# Patient Record
Sex: FEMALE | Race: WHITE
[De-identification: names, ages, dates, MRNs, and addresses within clinical notes are randomized per-mention and may not be internally consistent; named-entity substitution may affect disease eponyms.]

---

## 2020-02-11 NOTE — ED
Chest Pain HPI





- General


Chief Complaint: Chest Pain


Stated Complaint: chest pain


Time Seen by Provider: 02/11/20 07:01


Source: patient


Mode of arrival: ambulatory


Limitations: no limitations





- History of Present Illness


Initial Comments: 





This patient's a 30-year-old female who presents emergency department today with

chest pain and lower back pain after bending over to  her daughter.  

Patient reports that the pain feels worse than previous back pain and muscle 

spasms.  Patient states that she also feels somewhat short of breath.  She 

states that she has had no previous cardiac history or history of pulmonary 

she's.  She reports that she has been coughing over the past week.  Patient 

states that she has had no associated abdominal pain nausea or vomiting.. 





- Related Data


                                  Previous Rx's











 Medication  Instructions  Recorded


 


Cyclobenzaprine [Flexeril] 10 mg PO TID #9 tab 02/11/20


 


Ibuprofen [Motrin] 600 mg PO Q8HR PRN #20 tab 02/11/20











                                    Allergies











Allergy/AdvReac Type Severity Reaction Status Date / Time


 


Penicillins Allergy  Swelling Verified 02/11/20 06:38














Review of Systems


ROS Statement: 


Those systems with pertinent positive or pertinent negative responses have been 

documented in the HPI.





ROS Other: All systems not noted in ROS Statement are negative.





EKG Findings





- EKG Comments:


EKG Findings:: EKG performed at 6:50 AM shows sinus tachycardia otherwise normal

EKG.  Ventricular rate of 116 bpm.  Those 128 ms.  QRS ration 70 ms.  QT QTc is 

3:30/458 ms.





Past Medical History


Past Medical History: No Reported History


History of Any Multi-Drug Resistant Organisms: None Reported


Past Surgical History: Tonsillectomy


Past Psychological History: No Psychological Hx Reported


Smoking Status: Current every day smoker


Past Alcohol Use History: None Reported


Past Drug Use History: None Reported





General Exam





- General Exam Comments


Initial Comments: 





30-year-old female.  Alert and oriented.  Patient is mild discomfort. 


Limitations: no limitations


General appearance: alert, in no apparent distress


Head exam: Present: atraumatic, normocephalic, normal inspection


Eye exam: Present: normal appearance, PERRL, EOMI.  Absent: scleral icterus, 

conjunctival injection, periorbital swelling


ENT exam: Present: normal exam, mucous membranes moist


Neck exam: Present: normal inspection.  Absent: tenderness, meningismus, 

lymphadenopathy


Respiratory exam: Present: normal lung sounds bilaterally.  Absent: respiratory 

distress, wheezes, rales, rhonchi, stridor


Cardiovascular Exam: Present: regular rate


GI/Abdominal exam: Present: soft, normal bowel sounds.  Absent: distended, 

tenderness, guarding, rebound, rigid


Extremities exam: Present: normal inspection, full ROM, normal capillary refill.

 Absent: tenderness, pedal edema, joint swelling, calf tenderness


Back exam: Present: normal inspection, tenderness (Lumbar spinal tenderness and 

left-sided.   left sided Muscle spinal tenderness.)


Neurological exam: Present: alert, oriented X3, CN II-XII intact


Psychiatric exam: Present: normal affect, normal mood


Skin exam: Present: warm, dry, intact, normal color.  Absent: rash





Course


                                   Vital Signs











  02/11/20 02/11/20 02/11/20





  06:36 06:58 07:38


 


Temperature 98.1 F  


 


Pulse Rate 115 H  99


 


Respiratory 20 18 20





Rate   


 


Blood Pressure 125/69  120/74


 


O2 Sat by Pulse 97  99





Oximetry   














Chest Pain [facility]





- MDM





This Patient is a 30-year-old female presented today for either of her concern 

for chest pain and back pain with picking up the posterior her daughter.  

Patient is here with her sister.  At the same pain seems to be positional worse 

with movement.  When I went to evaluate the Patient and IV is established.  We 

did check blood work and this is unremarkable.  EKG showed some sinus 

tachycardia, her heart rate came to normal limits when she was resting in the 

room.  Patient chest x-rays negative.  Lumbar spine x-ray shows some mild 

degenerative changes and mild spinal curvature.  Lumbar MRI may be of benefit.  

Discussed this with Patient.  I discussed the Patient labs were otherwise 

unremarkable.  On reevaluation she is resting comfortably bed and sleeping.  She

does feel better after Toradol and Norflex.  I discussed discharged Patient with

a short course of Tequin for medicine and advise close follow-up with PCP.  All 

questions were answered return parameters were discussed.





Disposition


Clinical Impression: 


 Back spasm, Atypical chest pain





Disposition: HOME SELF-CARE


Condition: Stable


Instructions (If sedation given, give patient instructions):  Muscle Spasm (ED),

Chest Wall Pain (ED)


Additional Instructions: 


Patient advised against inflammatory medication.  Follow-up with PCP.  Return to

the ED if any alarming signs or symptoms occur.


Prescriptions: 


Cyclobenzaprine [Flexeril] 10 mg PO TID #9 tab


Ibuprofen [Motrin] 600 mg PO Q8HR PRN #20 tab


 PRN Reason: Pain


Is patient prescribed a controlled substance at d/c from ED?: No


Referrals: 


Nonstaff,Physician [Primary Care Provider] - 1-2 days


Meaghan Roach MD [REFERRING] - 1-2 days


Renato Ekcert MD [STAFF PHYSICIAN] - 1-2 days


Time of Disposition: 09:20

## 2020-02-11 NOTE — XR
EXAMINATION TYPE: XR chest 2V

 

DATE OF EXAM: 2/11/2020

 

COMPARISON: NONE

 

HISTORY: Chest pain

 

TECHNIQUE:  Frontal and lateral views of the chest are obtained.

 

FINDINGS:  There is no focal air space opacity, pleural effusion, or pneumothorax seen.  The cardiac 
silhouette size is within normal limits.   The osseous structures are intact. There are overlying car
diac leads.

 

IMPRESSION:  No acute cardiopulmonary process.

## 2020-02-11 NOTE — XR
Lumbar spine

 

HISTORY: Back pain

 

3 views of lumbar spine

 

There is a slight spinal curvature. Lumbar vertebral bodies show preserved height and bone mineraliza
tion. Mild spondylosis present. There is loss of disc height present at L2-3.

 

IMPRESSION: Mild spinal curvature, mild degenerative disc changes. Lumbar MRI may be of benefit.

## 2020-02-12 NOTE — ED
Back Pain HPI





- General


Chief Complaint: Back Pain/Injury


Stated Complaint: back & chest pain


Time Seen by Provider: 02/12/20 09:20


Source: patient


Limitations: no limitations





- History of Present Illness


Initial Comments: 


30-year-old female with history of IV drug use with no use in the last 3 years 

with cocaine use 2 days ago presented for chest pain low back pain.  Patient st

ates that she developed both low back and chest pain when she bent down and 

picked up her daughter.  She states that the pain is sharp in nature.  She 

states that the low back pain increases to palpation on the left side any 

twisting movement.  Patient states increases with lifting the left leg.  Patient

denies any sharp shooting pain down the leg denies a loss of bowel bladder 

control urinary retention fevers or history of cancer.  Patient denies any 

numbness of the lower extremities or weakness of the lower extremity.  Patient 

denies any midline tenderness she states is more off to the side.  Patient 

denies hematuria dysuria or urgency frequency or vaginal discharge.  Patient 

states she has also had on the left side of her chest just under the group a 

sharp pain that comes and goes and is present currently.  Denies pleuritic chest

pain denies shortness of breath denies cough status history of DVT or lower 

extremity swelling.  Patient has no other complaints and upon arrival she appea

rs well the signs of acute distress.








- Related Data


                                  Previous Rx's











 Medication  Instructions  Recorded


 


Cyclobenzaprine [Flexeril] 10 mg PO TID #9 tab 02/11/20


 


Ibuprofen [Motrin] 600 mg PO Q8HR PRN #20 tab 02/11/20











                                    Allergies











Allergy/AdvReac Type Severity Reaction Status Date / Time


 


Penicillins Allergy  Swelling Verified 02/12/20 08:58














Review of Systems


ROS Statement: 


Those systems with pertinent positive or pertinent negative responses have been 

documented in the HPI.





ROS Other: All systems not noted in ROS Statement are negative.





Past Medical History


Past Medical History: No Reported History


History of Any Multi-Drug Resistant Organisms: None Reported


Past Surgical History: Tonsillectomy


Past Psychological History: No Psychological Hx Reported


Smoking Status: Current every day smoker


Past Alcohol Use History: None Reported


Past Drug Use History: None Reported





General Exam





- General Exam Comments


Initial Comments: 


General:  The patient is awake and alert, in no distress, and does not appear 

acutely ill. 


Eye:  +3 mm pupils are equal, round and reactive to light, extra-ocular 

movements are intact.  No nystagmus.  There is normal conjunctiva bilaterally.  

No signs of icterus.  


Ears, nose, mouth and throat:  There are moist mucous membranes and no oral 

lesions. 


Neck:  The neck is supple, there is no tenderness or JVD.  


Cardiovascular:  There is a regular rate and rhythm. Holosystolic mumur grade 1 

no rub or gallop is appreciated.


Respiratory:  Lungs are clear to auscultation, respirations are non-labored, 

breath sounds are equal.  No wheezes, stridor, rales, or rhonchi.


Gastrointestinal:  Soft, non-distended, non-tender abdomen without masses or 

organomegaly noted. There is no rebound or guarding present.  


Musculoskeletal: No midline tenderness to patient the cervical thoracic and 

lumbar spine.  Patient has mild paraspinal left-sided tenderness to palpation.  

Positive straight leg raise left side.  No CVA tenderness.  Strength and range 

of motion of the lower extremity bilaterally.  Sensation including the saddle 

region.  Patient is +2 dorsalis pedis pulses as well as radial pulses.  

Bilaterally equal


Neurological:  A&O x 3. CN II-XII intact grossly, There are no obvious motor or 

sensory deficits. Coordination appears grossly intact. Speech is normal.


Skin:  Skin is warm and dry and no rashes or lesions are noted. No LE edema.


Psychiatric:  Cooperative, appropriate mood & affect, normal judgment.  





Limitations: no limitations





Course


                                   Vital Signs











  02/12/20





  08:55


 


Temperature 98.5 F


 


Pulse Rate 100


 


Respiratory 18





Rate 


 


Blood Pressure 134/75


 


O2 Sat by Pulse 96





Oximetry 














Medical Decision Making





- Medical Decision Making


30-year-old male presenting today for chief complaint of low back pain, chest 

pain, indigestion yesterday revealed UDS consistent with cocaine abuse otherwise

 nose and can't acute findings troponin negative.  EKG no acute findings 

troponin negative d-dimer WNL. Patient VS stable. Afebrile denies IVDU with (-) 

UDS yesterday. Patient CXR clear. Pain controlled with one dose of morphine.  

Patient had echocardiogram change giving the murmur that was auscultated on 

physical examination.  Patient however did not want to wait for echocardiogram 

reading by cardiology and left AGAINST MEDICAL ADVICE prior to completion of 

care.  She is aware that this could result in disability or death worsening 

infection or current state. Patient left the emergency department.








- Lab Data


Result diagrams: 


                                 02/12/20 09:36





                                 02/12/20 09:36


                                   Lab Results











  02/12/20 02/12/20 02/12/20 Range/Units





  09:36 09:36 09:36 


 


WBC  6.7    (3.8-10.6)  k/uL


 


RBC  4.43    (3.80-5.40)  m/uL


 


Hgb  13.7    (11.4-16.0)  gm/dL


 


Hct  41.9    (34.0-46.0)  %


 


MCV  94.6    (80.0-100.0)  fL


 


MCH  30.9    (25.0-35.0)  pg


 


MCHC  32.6    (31.0-37.0)  g/dL


 


RDW  12.7    (11.5-15.5)  %


 


Plt Count  243    (150-450)  k/uL


 


Neutrophils %  55    %


 


Lymphocytes %  34    %


 


Monocytes %  5    %


 


Eosinophils %  3    %


 


Basophils %  1    %


 


Neutrophils #  3.7    (1.3-7.7)  k/uL


 


Lymphocytes #  2.3    (1.0-4.8)  k/uL


 


Monocytes #  0.3    (0-1.0)  k/uL


 


Eosinophils #  0.2    (0-0.7)  k/uL


 


Basophils #  0.1    (0-0.2)  k/uL


 


PT    9.6  (9.0-12.0)  sec


 


INR    0.9  (<1.2)  


 


APTT    22.5  (22.0-30.0)  sec


 


D-Dimer    0.19  (<0.60)  mg/L FEU


 


Sodium   136 L   (137-145)  mmol/L


 


Potassium   4.5   (3.5-5.1)  mmol/L


 


Chloride   106   ()  mmol/L


 


Carbon Dioxide   26   (22-30)  mmol/L


 


Anion Gap   4   mmol/L


 


BUN   15   (7-17)  mg/dL


 


Creatinine   0.58   (0.52-1.04)  mg/dL


 


Est GFR (CKD-EPI)AfAm   >90   (>60 ml/min/1.73 sqM)  


 


Est GFR (CKD-EPI)NonAf   >90   (>60 ml/min/1.73 sqM)  


 


Glucose   99   (74-99)  mg/dL


 


Calcium   8.9   (8.4-10.2)  mg/dL


 


Magnesium   1.9   (1.6-2.3)  mg/dL


 


Total Bilirubin   0.5   (0.2-1.3)  mg/dL


 


AST   36   (14-36)  U/L


 


ALT   37 H   (4-34)  U/L


 


Alkaline Phosphatase   78   ()  U/L


 


Troponin I     (0.000-0.034)  ng/mL


 


Total Protein   6.4   (6.3-8.2)  g/dL


 


Albumin   3.7   (3.5-5.0)  g/dL


 


Urine Color     


 


Urine Appearance     (Clear)  


 


Urine pH     (5.0-8.0)  


 


Ur Specific Gravity     (1.001-1.035)  


 


Urine Protein     (Negative)  


 


Urine Glucose (UA)     (Negative)  


 


Urine Ketones     (Negative)  


 


Urine Blood     (Negative)  


 


Urine Nitrite     (Negative)  


 


Urine Bilirubin     (Negative)  


 


Urine Urobilinogen     (<2.0)  mg/dL


 


Ur Leukocyte Esterase     (Negative)  


 


Urine HCG, Qual     (Not Detectd)  














  02/12/20 02/12/20 02/12/20 Range/Units





  09:36 11:45 11:45 


 


WBC     (3.8-10.6)  k/uL


 


RBC     (3.80-5.40)  m/uL


 


Hgb     (11.4-16.0)  gm/dL


 


Hct     (34.0-46.0)  %


 


MCV     (80.0-100.0)  fL


 


MCH     (25.0-35.0)  pg


 


MCHC     (31.0-37.0)  g/dL


 


RDW     (11.5-15.5)  %


 


Plt Count     (150-450)  k/uL


 


Neutrophils %     %


 


Lymphocytes %     %


 


Monocytes %     %


 


Eosinophils %     %


 


Basophils %     %


 


Neutrophils #     (1.3-7.7)  k/uL


 


Lymphocytes #     (1.0-4.8)  k/uL


 


Monocytes #     (0-1.0)  k/uL


 


Eosinophils #     (0-0.7)  k/uL


 


Basophils #     (0-0.2)  k/uL


 


PT     (9.0-12.0)  sec


 


INR     (<1.2)  


 


APTT     (22.0-30.0)  sec


 


D-Dimer     (<0.60)  mg/L FEU


 


Sodium     (137-145)  mmol/L


 


Potassium     (3.5-5.1)  mmol/L


 


Chloride     ()  mmol/L


 


Carbon Dioxide     (22-30)  mmol/L


 


Anion Gap     mmol/L


 


BUN     (7-17)  mg/dL


 


Creatinine     (0.52-1.04)  mg/dL


 


Est GFR (CKD-EPI)AfAm     (>60 ml/min/1.73 sqM)  


 


Est GFR (CKD-EPI)NonAf     (>60 ml/min/1.73 sqM)  


 


Glucose     (74-99)  mg/dL


 


Calcium     (8.4-10.2)  mg/dL


 


Magnesium     (1.6-2.3)  mg/dL


 


Total Bilirubin     (0.2-1.3)  mg/dL


 


AST     (14-36)  U/L


 


ALT     (4-34)  U/L


 


Alkaline Phosphatase     ()  U/L


 


Troponin I  <0.012    (0.000-0.034)  ng/mL


 


Total Protein     (6.3-8.2)  g/dL


 


Albumin     (3.5-5.0)  g/dL


 


Urine Color    Light Yellow  


 


Urine Appearance    Clear  (Clear)  


 


Urine pH    7.5  (5.0-8.0)  


 


Ur Specific Gravity    1.016  (1.001-1.035)  


 


Urine Protein    Negative  (Negative)  


 


Urine Glucose (UA)    Negative  (Negative)  


 


Urine Ketones    Negative  (Negative)  


 


Urine Blood    Negative  (Negative)  


 


Urine Nitrite    Negative  (Negative)  


 


Urine Bilirubin    Negative  (Negative)  


 


Urine Urobilinogen    <2.0  (<2.0)  mg/dL


 


Ur Leukocyte Esterase    Negative  (Negative)  


 


Urine HCG, Qual   Not Detected   (Not Detectd)  














Disposition


Clinical Impression: 


 Chest pain, Low back pain, Back injury





Disposition: Left Against Medical Advice


Condition: Undetermined


Referrals: 


Nonstaff,Physician [Primary Care Provider] - 1-2 days


Time of Disposition: 12:56

## 2020-02-12 NOTE — ECHOF
Referral Reason:murmur, chest pain, hx of IVDU (denies current)



MEASUREMENTS

--------

HEIGHT: 165.1 cm

WEIGHT: 79.4 kg

BP: 

RVIDd:   2.4 cm     (< 3.3)

IVSd:   1.1 cm     (0.6 - 1.1)

LVIDd:   3.9 cm     (3.9 - 5.3)

LVPWd:   1.1 cm     (0.6 - 1.1)

IVSs:   1.4 cm

LVIDs:   2.6 cm

LVPWs:   1.6 cm

LAESV Index (A-L):   21.81 ml/m

Ao Diam:   2.6 cm     (2.0 - 3.7)

AV Cusp:   2.1 cm     (1.5 - 2.6)

LA Diam:   2.6 cm     (2.7 - 3.8)

MV EXCURSION:   13.362 mm     (> 18.000)

MV EF SLOPE:   180 mm/s     (70 - 150)

EPSS:   0.5 cm

MV E Pierre:   0.86 m/s

MV DecT:   189 ms

MV A Pierre:   0.50 m/s

MV E/A Ratio:   1.74 

RAP:   5.00 mmHg

RVSP:   18.11 mmHg

TAPSE:   21.48 mm







FINDINGS

--------

Sinus rhythm.

This was a technically good study.

The left ventricular size is normal.   Left ventricular wall thickness is normal.   Overall left vent
ricular systolic function is normal with, an EF between 55 - 60 %.   The diastolic filling pattern is
 normal for the age of the patient 7.20.

The right ventricle is normal in size.

The left atrial size is normal.

The right atrial size is normal.

The aortic valve is trileaflet and appears structurally normal.

The mitral valve is normal.   The mitral valve leaflets are mildly thickened.   Mild mitral regurgita
tion is present.

The tricuspid valve appears structurally normal.   Trace tricuspid regurgitation present.   Right maribell
tricular systolic pressure is normal at < 35 mmHg.

There is no pulmonic regurgitation present.

The aortic root size is normal.

Normal inferior vena cava with normal inspiratory collapse consistent with estimated right atrial pre
ssure of  5 mmHg.

There is no pericardial effusion.



CONCLUSIONS

--------

1. Sinus rhythm.

2. This was a technically good study.

3. The left ventricular size is normal.

4. Left ventricular wall thickness is normal.

5. Overall left ventricular systolic function is normal with, an EF between 55 - 60 %.

6. The diastolic filling pattern is normal for the age of the patient 7.20

7. The right ventricle is normal in size.

8. The left atrial size is normal.

9. The right atrial size is normal.

10. The aortic valve is trileaflet and appears structurally normal.

11. The mitral valve is normal.

12. The mitral valve leaflets are mildly thickened.

13. Mild mitral regurgitation is present.

14. The tricuspid valve appears structurally normal.

15. Trace tricuspid regurgitation present.

16. Right ventricular systolic pressure is normal at < 35 mmHg.

17. There is no pulmonic regurgitation present.

18. The aortic root size is normal.

19. Normal inferior vena cava with normal inspiratory collapse consistent with estimated right atrial
 pressure of  5 mmHg.

20. There is no pericardial effusion.





SONOGRAPHER: Pricilla Palacio RDCS

## 2020-02-18 NOTE — ED
Overdose HPI





- General


Stated Complaint: overdose


Time Seen by Provider: 02/18/20 01:13





- History of Present Illness


Initial Comments: 


Bladimir is a 30-year-old female with a history of substance abuse, patient 

reports she's been clean for 2-1/2 years.  Today she received some terrible news

that her mother has stage IV stomach cancer.  Patient became upset and reports 

that she started what she believed was heroin.  Patient subsequently overdose, 

boyfriend found her and started CPR because she was not breathing.  911 was 

called.  EMS arrived on scene, patient was unresponsive and apneic but had 

strong pulses, CPR was discontinued, provided patient with IV Narcan incessantly

woke up.  Initially she tried to refuse coming to the hospital but agreed.  Upon

arrival she reports she is feeling fine with no complaints.








- Related Data


                                  Previous Rx's











 Medication  Instructions  Recorded


 


Cyclobenzaprine [Flexeril] 10 mg PO TID #9 tab 02/11/20


 


Ibuprofen [Motrin] 600 mg PO Q8HR PRN #20 tab 02/11/20











                                    Allergies











Allergy/AdvReac Type Severity Reaction Status Date / Time


 


Penicillins Allergy  Swelling Verified 02/18/20 01:20














Review of Systems


ROS Statement: 


Those systems with pertinent positive or pertinent negative responses have been 

documented in the HPI.





ROS Other: All systems not noted in ROS Statement are negative.





Past Medical History


Past Medical History: No Reported History


History of Any Multi-Drug Resistant Organisms: None Reported


Past Surgical History: Tonsillectomy


Past Psychological History: No Psychological Hx Reported


Smoking Status: Current every day smoker


Past Alcohol Use History: None Reported


Past Drug Use History: None Reported





General Exam





- General Exam Comments


Initial Comments: 


Physical Exam


GENERAL:


Patient is well-developed and well-nourished.  Patient is nontoxic and well-

hydrated and is in no distress.





HENT:


Normocephalic, Atraumatic. 





EYES:


PERRL, EOMI





PULMONARY:


Unlabored respirations.  No audible rales rhonchi or wheezing was noted.





CARDIOVASCULAR:


Tachycardiac, regular





ABDOMEN:


Soft and nontender with normal bowel sounds. 





SKIN:


Skin is clear with no lesions or rashes and otherwise unremarkable.





: 


Deferred





NEUROLOGIC:


Patient is alert and oriented x3.  Moving all extremities spontaneously





MUSCULOSKELETAL:


Normal extremities with adequate strength and full range of motion.  No lower 

extremity swelling or edema.  No calf tenderness.  





PSYCHIATRIC:


Normal psychiatric evaluation.








Course


                                   Vital Signs











  02/18/20 02/18/20





  01:16 03:05


 


Temperature 98.4 F 98 F


 


Pulse Rate 98 89


 


Respiratory 20 20





Rate  


 


Blood Pressure 137/83 138/78


 


O2 Sat by Pulse 97 97





Oximetry  














Medical Decision Making





- Medical Decision Making


The patient was seen and evaluated history is obtained from the patient


Patient has been clean for 2-1/2 years started what she believed to be heroin 

today and subsequent was noted to overdose, bystanders began CPR when she was 

unresponsive to EMS arrived on scene and patient had strong pulses, she was 

apneic and unresponsive until she received Narcan.  Patient since then has been 

awake alert and oriented with no complaints.





Patient arrived to the emergency department in no acute distress, no chest pain 

no bruising noted


Chest x-ray was unremarkable


Urine pregnancy negative, urine drug screen is positive only for cocaine





These results were discussed with patient.  I advised I suspect that the drug 

that she snorted may have been fentanyl and cocaine rather than heroin. 


Patient has remained awake alert oriented throughout her stay in the emergency 

department with no signs of  respiratory depression.  At this time patient 

stable for discharge home.








- Lab Data


                                   Lab Results











  02/18/20 02/18/20 Range/Units





  01:32 01:32 


 


Urine HCG, Qual   Not Detected  (Not Detectd)  


 


Urine Opiates Screen  Not Detected   (NotDetected)  


 


Ur Oxycodone Screen  Not Detected   (NotDetected)  


 


Urine Methadone Screen  Not Detected   (NotDetected)  


 


Ur Propoxyphene Screen  Not Detected   (NotDetected)  


 


Ur Barbiturates Screen  Not Detected   (NotDetected)  


 


U Tricyclic Antidepress  Not Detected   (NotDetected)  


 


Ur Phencyclidine Scrn  Not Detected   (NotDetected)  


 


Ur Amphetamines Screen  Not Detected   (NotDetected)  


 


U Methamphetamines Scrn  Not Detected   (NotDetected)  


 


U Benzodiazepines Scrn  Not Detected   (NotDetected)  


 


Urine Cocaine Screen  Detected H   (NotDetected)  


 


U Marijuana (THC) Screen  Not Detected   (NotDetected)  














Disposition


Clinical Impression: 


 Accidental drug overdose





Disposition: HOME SELF-CARE


Condition: Stable


Instructions (If sedation given, give patient instructions):  Adult Overdose 

(ED)


Is patient prescribed a controlled substance at d/c from ED?: No


Referrals: 


Nonstaff,Physician [Primary Care Provider] - 1-2 days

## 2020-02-18 NOTE — XR
EXAMINATION TYPE: XR chest 2V

 

DATE OF EXAM: 2/18/2020

 

COMPARISON: 2/11/2020

 

HISTORY: Chest pain

 

TECHNIQUE:

 

FINDINGS: Heart and mediastinum are normal. Lungs are clear. Diaphragm is normal. Bony thorax appears
 normal.

 

IMPRESSION: Normal chest. No change.

## 2020-05-18 NOTE — ED
General Adult HPI





- General


Chief complaint: Vaginal Bleeding


Stated complaint: spotting ,10 weeks pregnant


Time Seen by Provider: 20 13:44


Source: patient, RN notes reviewed


Mode of arrival: ambulatory


Limitations: no limitations





- History of Present Illness


Initial comments: 





30-year-old  female currently about 2 months pregnant presents to the 

emergency department for vaginal spotting.  Patient states she started spotting 

last night.  Patient states that she had 5 positive pregnancy tests within the 

past week.  Patient states she has miscarried twice in the past.  Patient is 

unsure of her last menstrual period but states it was about 1 month ago.  

Patient also has some suprapubic pressure but denies any other symptoms.Patient 

has no other complaints at this time including shortness of breath, chest pain, 

abd pain, nausea or vomiting, headache, or visual changes.





- Related Data


                                  Previous Rx's











 Medication  Instructions  Recorded


 


Cyclobenzaprine [Flexeril] 10 mg PO TID #9 tab 20


 


Ibuprofen [Motrin] 600 mg PO Q8HR PRN #20 tab 20











                                    Allergies











Allergy/AdvReac Type Severity Reaction Status Date / Time


 


Penicillins Allergy  Swelling Verified 20 13:41














Review of Systems


ROS Statement: 


Those systems with pertinent positive or pertinent negative responses have been 

documented in the HPI.





ROS Other: All systems not noted in ROS Statement are negative.





Past Medical History


Past Medical History: Asthma


History of Any Multi-Drug Resistant Organisms: None Reported


Past Surgical History: Tonsillectomy


Past Psychological History: No Psychological Hx Reported


Smoking Status: Current every day smoker


Past Alcohol Use History: None Reported


Past Drug Use History: None Reported





General Exam


Limitations: no limitations


General appearance: alert, in no apparent distress


Head exam: Present: atraumatic, normocephalic, normal inspection


Eye exam: Present: normal appearance, PERRL, EOMI.  Absent: scleral icterus, 

conjunctival injection, periorbital swelling


ENT exam: Present: normal exam, mucous membranes moist


Neck exam: Present: normal inspection, full ROM.  Absent: tenderness, 

meningismus, lymphadenopathy


Respiratory exam: Present: normal lung sounds bilaterally.  Absent: respiratory 

distress, wheezes, rales, rhonchi, stridor


Cardiovascular Exam: Present: regular rate, normal rhythm, normal heart sounds. 

 Absent: systolic murmur, diastolic murmur, rubs, gallop, clicks


GI/Abdominal exam: Present: soft, normal bowel sounds.  Absent: distended, 

tenderness, guarding, rebound, rigid


External exam: Present: normal external exam.  Absent: erythema, swelling, 

lesions, lacerations, ecchymosis


Speculum exam: Present: normal speculum exam.  Absent: erythema, vaginal 

discharge, cervical discharge, vaginal bleeding, foreign body, tissue, 

laceration


By manual exam: Present: uterine tenderness.  Absent: cervical motion 

tenderness, adnexal tenderness, adnexal mass, uterine enlargement, other


Neurological exam: Present: alert





Course


                                   Vital Signs











  20





  13:39


 


Temperature 98.4 F


 


Pulse Rate 109 H


 


Respiratory 18





Rate 


 


Blood Pressure 131/82


 


O2 Sat by Pulse 99





Oximetry 














Medical Decision Making





- Medical Decision Making





Vitals are stable.  Physical exam is unremarkable.  CBC CMP unremarkable.  

Urinalysis does not show any evidence of infection.  There is no bacteria in the

 urine.  Troponin is negative.  Gonorrhea chlamydia pending.  She is O+.  

Ultrasound shows fetal pole not identified with certainty.  Findings could 

possibly represent incomplete miscarriage, fetal demise, or early pregnancy.  

HCG is 1838.  I discussed the patient the importance of repeating this in 2 days

 to see if it is spreading upwards.  She sees an OB/GYN in [place].  Results will

 be sent to them.  Patient is aware she needs repeat ultrasound.  She will 

return here for worsening symptoms.





- Lab Data


Result diagrams: 


                                 20 14:20





                                 20 14:20


                                   Lab Results











  20 Range/Units





  14:00 14:00 14:20 


 


WBC    8.0  (3.8-10.6)  k/uL


 


RBC    4.33  (3.80-5.40)  m/uL


 


Hgb    13.4  (11.4-16.0)  gm/dL


 


Hct    40.4  (34.0-46.0)  %


 


MCV    93.5  (80.0-100.0)  fL


 


MCH    31.0  (25.0-35.0)  pg


 


MCHC    33.2  (31.0-37.0)  g/dL


 


RDW    12.3  (11.5-15.5)  %


 


Plt Count    241  (150-450)  k/uL


 


Neutrophils %    72  %


 


Lymphocytes %    22  %


 


Monocytes %    4  %


 


Eosinophils %    1  %


 


Basophils %    1  %


 


Neutrophils #    5.7  (1.3-7.7)  k/uL


 


Lymphocytes #    1.8  (1.0-4.8)  k/uL


 


Monocytes #    0.3  (0-1.0)  k/uL


 


Eosinophils #    0.1  (0-0.7)  k/uL


 


Basophils #    0.1  (0-0.2)  k/uL


 


Sodium     (137-145)  mmol/L


 


Potassium     (3.5-5.1)  mmol/L


 


Chloride     ()  mmol/L


 


Carbon Dioxide     (22-30)  mmol/L


 


Anion Gap     mmol/L


 


BUN     (7-17)  mg/dL


 


Creatinine     (0.52-1.04)  mg/dL


 


Est GFR (CKD-EPI)AfAm     (>60 ml/min/1.73 sqM)  


 


Est GFR (CKD-EPI)NonAf     (>60 ml/min/1.73 sqM)  


 


Glucose     (74-99)  mg/dL


 


Calcium     (8.4-10.2)  mg/dL


 


Total Bilirubin     (0.2-1.3)  mg/dL


 


AST     (14-36)  U/L


 


ALT     (4-34)  U/L


 


Alkaline Phosphatase     ()  U/L


 


Total Protein     (6.3-8.2)  g/dL


 


Albumin     (3.5-5.0)  g/dL


 


HCG, Quant     mIU/mL


 


Urine Color  Yellow    


 


Urine Appearance  Cloudy H    (Clear)  


 


Urine pH  6.5    (5.0-8.0)  


 


Ur Specific Gravity  1.025    (1.001-1.035)  


 


Urine Protein  Trace H    (Negative)  


 


Urine Glucose (UA)  Negative    (Negative)  


 


Urine Ketones  Negative    (Negative)  


 


Urine Blood  Negative    (Negative)  


 


Urine Nitrite  Negative    (Negative)  


 


Urine Bilirubin  Negative    (Negative)  


 


Urine Urobilinogen  2.0    (<2.0)  mg/dL


 


Ur Leukocyte Esterase  Moderate H    (Negative)  


 


Urine RBC  1    (0-5)  /hpf


 


Urine WBC  3    (0-5)  /hpf


 


Ur Squamous Epith Cells  34 H    (0-4)  /hpf


 


Amorphous Sediment  Rare H    (None)  /hpf


 


Hyaline Casts  1    (0-2)  /lpf


 


Urine Mucus  Few H    (None)  /hpf


 


Urine HCG, Qual   Detected   (Not Detectd)  


 


Trichomonas Ag (Rapid)     (Negative)  


 


Blood Type     


 


Blood Type Recheck     


 


Bld Type Recheck Status     














  05/18/20 05/18/20 05/18/20 Range/Units





  14:20 14:20 14:20 


 


WBC     (3.8-10.6)  k/uL


 


RBC     (3.80-5.40)  m/uL


 


Hgb     (11.4-16.0)  gm/dL


 


Hct     (34.0-46.0)  %


 


MCV     (80.0-100.0)  fL


 


MCH     (25.0-35.0)  pg


 


MCHC     (31.0-37.0)  g/dL


 


RDW     (11.5-15.5)  %


 


Plt Count     (150-450)  k/uL


 


Neutrophils %     %


 


Lymphocytes %     %


 


Monocytes %     %


 


Eosinophils %     %


 


Basophils %     %


 


Neutrophils #     (1.3-7.7)  k/uL


 


Lymphocytes #     (1.0-4.8)  k/uL


 


Monocytes #     (0-1.0)  k/uL


 


Eosinophils #     (0-0.7)  k/uL


 


Basophils #     (0-0.2)  k/uL


 


Sodium  137    (137-145)  mmol/L


 


Potassium  3.7    (3.5-5.1)  mmol/L


 


Chloride  107    ()  mmol/L


 


Carbon Dioxide  23    (22-30)  mmol/L


 


Anion Gap  7    mmol/L


 


BUN  8    (7-17)  mg/dL


 


Creatinine  0.61    (0.52-1.04)  mg/dL


 


Est GFR (CKD-EPI)AfAm  >90    (>60 ml/min/1.73 sqM)  


 


Est GFR (CKD-EPI)NonAf  >90    (>60 ml/min/1.73 sqM)  


 


Glucose  138 H    (74-99)  mg/dL


 


Calcium  9.1    (8.4-10.2)  mg/dL


 


Total Bilirubin  0.3    (0.2-1.3)  mg/dL


 


AST  44 H    (14-36)  U/L


 


ALT  61 H    (4-34)  U/L


 


Alkaline Phosphatase  87    ()  U/L


 


Total Protein  6.5    (6.3-8.2)  g/dL


 


Albumin  3.8    (3.5-5.0)  g/dL


 


HCG, Quant    1838.9  mIU/mL


 


Urine Color     


 


Urine Appearance     (Clear)  


 


Urine pH     (5.0-8.0)  


 


Ur Specific Gravity     (1.001-1.035)  


 


Urine Protein     (Negative)  


 


Urine Glucose (UA)     (Negative)  


 


Urine Ketones     (Negative)  


 


Urine Blood     (Negative)  


 


Urine Nitrite     (Negative)  


 


Urine Bilirubin     (Negative)  


 


Urine Urobilinogen     (<2.0)  mg/dL


 


Ur Leukocyte Esterase     (Negative)  


 


Urine RBC     (0-5)  /hpf


 


Urine WBC     (0-5)  /hpf


 


Ur Squamous Epith Cells     (0-4)  /hpf


 


Amorphous Sediment     (None)  /hpf


 


Hyaline Casts     (0-2)  /lpf


 


Urine Mucus     (None)  /hpf


 


Urine HCG, Qual     (Not Detectd)  


 


Trichomonas Ag (Rapid)     (Negative)  


 


Blood Type   O Positive   


 


Blood Type Recheck   No Previous Record   


 


Bld Type Recheck Status   [facility] ONLY   














  20 Range/Units





  15:54 


 


WBC   (3.8-10.6)  k/uL


 


RBC   (3.80-5.40)  m/uL


 


Hgb   (11.4-16.0)  gm/dL


 


Hct   (34.0-46.0)  %


 


MCV   (80.0-100.0)  fL


 


MCH   (25.0-35.0)  pg


 


MCHC   (31.0-37.0)  g/dL


 


RDW   (11.5-15.5)  %


 


Plt Count   (150-450)  k/uL


 


Neutrophils %   %


 


Lymphocytes %   %


 


Monocytes %   %


 


Eosinophils %   %


 


Basophils %   %


 


Neutrophils #   (1.3-7.7)  k/uL


 


Lymphocytes #   (1.0-4.8)  k/uL


 


Monocytes #   (0-1.0)  k/uL


 


Eosinophils #   (0-0.7)  k/uL


 


Basophils #   (0-0.2)  k/uL


 


Sodium   (137-145)  mmol/L


 


Potassium   (3.5-5.1)  mmol/L


 


Chloride   ()  mmol/L


 


Carbon Dioxide   (22-30)  mmol/L


 


Anion Gap   mmol/L


 


BUN   (7-17)  mg/dL


 


Creatinine   (0.52-1.04)  mg/dL


 


Est GFR (CKD-EPI)AfAm   (>60 ml/min/1.73 sqM)  


 


Est GFR (CKD-EPI)NonAf   (>60 ml/min/1.73 sqM)  


 


Glucose   (74-99)  mg/dL


 


Calcium   (8.4-10.2)  mg/dL


 


Total Bilirubin   (0.2-1.3)  mg/dL


 


AST   (14-36)  U/L


 


ALT   (4-34)  U/L


 


Alkaline Phosphatase   ()  U/L


 


Total Protein   (6.3-8.2)  g/dL


 


Albumin   (3.5-5.0)  g/dL


 


HCG, Quant   mIU/mL


 


Urine Color   


 


Urine Appearance   (Clear)  


 


Urine pH   (5.0-8.0)  


 


Ur Specific Gravity   (1.001-1.035)  


 


Urine Protein   (Negative)  


 


Urine Glucose (UA)   (Negative)  


 


Urine Ketones   (Negative)  


 


Urine Blood   (Negative)  


 


Urine Nitrite   (Negative)  


 


Urine Bilirubin   (Negative)  


 


Urine Urobilinogen   (<2.0)  mg/dL


 


Ur Leukocyte Esterase   (Negative)  


 


Urine RBC   (0-5)  /hpf


 


Urine WBC   (0-5)  /hpf


 


Ur Squamous Epith Cells   (0-4)  /hpf


 


Amorphous Sediment   (None)  /hpf


 


Hyaline Casts   (0-2)  /lpf


 


Urine Mucus   (None)  /hpf


 


Urine HCG, Qual   (Not Detectd)  


 


Trichomonas Ag (Rapid)  Negative  (Negative)  


 


Blood Type   


 


Blood Type Recheck   


 


Bld Type Recheck Status   














Disposition


Clinical Impression: 


 Vaginal bleeding during pregnancy





Disposition: HOME SELF-CARE


Condition: Fair


Instructions (If sedation given, give patient instructions):  Threatened 

Miscarriage (ED)


Additional Instructions: 


Please repeat hCG in 2 days.  Follow-up with OB/GYN by calling either today or 

tomorrow morning.  If you have worsening since it is worsening pain return to 

the emergency department.  As discussed U will need another ultrasound as well.


Is patient prescribed a controlled substance at d/c from ED?: No


Referrals: 


Meaghan Roach MD [REFERRING] - 1-2 days


Time of Disposition: 16:26

## 2020-05-18 NOTE — US
EXAMINATION TYPE: Transabdominal

 

DATE OF EXAM: 5/18/2020 3:17 PM

 

COMPARISON: NONE

 

CLINICAL HISTORY: pain. Spotting and pain

 

EXAM PERFORMED:  Transabdominal (TA)

 

EXAM MEASUREMENTS:

 

GESTATIONAL AGE / DATING

 

Physician Established: Not yet established 

Dates by LMP:  LMP unknown 

Dates by First Scan:  No previous this is first scan  

Dates by Current Scan for: ( weeks/5 days)  

** EDC: 01/06/2021

 

MATERNAL ANATOMY 

 

Uterus: 9.1 x 4.8 x 6.6 cm 

Right Ovary: 3.9 x 2.5 x 2.3 cm 

Left Ovary: 3.8 x 1.8 x 1.7 cm 

Post CDS / Adnexa: wnl

Presence of free fluid: no

Presence of corpus luteal cyst: no

Presence of subchorionic bleed: no

 

GESTATION / FETAL SURVEY

 

CRL: 0.8 cm (6 weeks/5 days) not well seen

Yolk Sac (normal less than 6mm): 5 mm

IUP:

 

The gestational sac is irregular in appearance, a fetal pole is not seen with certainty

 

IMPRESSION:

Fetal pole not identified with certainty on images provided. Consider follow-up, endovaginal scanning
 for better evaluation. Findings could possibly represent incomplete miscarriage or fetal demise or e
ailyn pregnancy, technologist reports no fetal activity with fetal pole noted, these findings not well
 seen on transabdominal scanning.

## 2020-06-01 NOTE — ED
General Adult HPI





- General


Chief complaint: Urogenital


Stated complaint: 9 wks pregnant/lower abd pain


Time Seen by Provider: 20 15:04


Source: patient, RN notes reviewed


Mode of arrival: ambulatory


Limitations: no limitations





- History of Present Illness


Initial comments: 





30 -year-old  female currently about 9 weeks pregnant presents to the 

emergency department for a chief complaint of dysuria.  Patient states she has 

had dysuria for the past several days.  States she has been trying to drink 

plenty of water to flush out the bacteria but it is not working.  Patient was 

tested for gonorrhea and chlamydia 2 weeks ago as well as tremors which were 

negative but does report that she has had intercourse since that time.  Patient 

has not followed up with her OB nor did she have repeat blood testing performed 

as directed.  Patient still reports some suprapubic pain denies vaginal 

bleeding.Patient has no other complaints at this time including shortness of 

breath, chest pain, nausea or vomiting, headache, or visual changes.





- Related Data


                                  Previous Rx's











 Medication  Instructions  Recorded


 


Cyclobenzaprine [Flexeril] 10 mg PO TID #9 tab 20


 


Ibuprofen [Motrin] 600 mg PO Q8HR PRN #20 tab 20


 


Cephalexin [Keflex] 500 mg PO Q6HR 7 Days #28 cap 20











                                    Allergies











Allergy/AdvReac Type Severity Reaction Status Date / Time


 


Penicillins Allergy  Swelling Verified 20 14:02














Review of Systems


ROS Statement: 


Those systems with pertinent positive or pertinent negative responses have been 

documented in the HPI.





ROS Other: All systems not noted in ROS Statement are negative.





Past Medical History


Past Medical History: Asthma


History of Any Multi-Drug Resistant Organisms: None Reported


Past Surgical History: Tonsillectomy


Past Psychological History: Anxiety


Smoking Status: Current every day smoker


Past Alcohol Use History: None Reported


Past Drug Use History: Marijuana





General Exam


Limitations: no limitations


General appearance: alert, in no apparent distress


Head exam: Present: atraumatic, normocephalic, normal inspection


Eye exam: Present: normal appearance, PERRL, EOMI.  Absent: scleral icterus, 

conjunctival injection, periorbital swelling


ENT exam: Present: normal exam, mucous membranes moist


Neck exam: Present: normal inspection, full ROM.  Absent: tenderness, 

meningismus, lymphadenopathy


Respiratory exam: Present: normal lung sounds bilaterally.  Absent: respiratory 

distress, wheezes, rales, rhonchi, stridor


Cardiovascular Exam: Present: regular rate, normal rhythm, normal heart sounds. 

 Absent: systolic murmur, diastolic murmur, rubs, gallop, clicks


GI/Abdominal exam: Present: soft, normal bowel sounds.  Absent: distended, 

tenderness, guarding, rebound, rigid


External exam: Present: normal external exam.  Absent: erythema, swelling, 

lesions, lacerations, ecchymosis


Speculum exam: Present: normal speculum exam.  Absent: erythema, vaginal 

discharge, cervical discharge, vaginal bleeding, foreign body, tissue, 

laceration


By manual exam: Present: normal by manual exam.  Absent: cervical motion 

tenderness, adnexal tenderness, adnexal mass, uterine enlargement, uterine 

tenderness


Neurological exam: Present: alert


Psychiatric exam: Present: normal affect, normal mood





Course


                                   Vital Signs











  20





  14:00


 


Temperature 98.4 F


 


Pulse Rate 96


 


Respiratory 18





Rate 


 


Blood Pressure 155/79


 


O2 Sat by Pulse 100





Oximetry 














Medical Decision Making





- Medical Decision Making





Vitals are stable.  CBC CMP is unremarkable.  Urinalysis does show moderate 

leukocyte esterase with 5 white blood cells and occasional mucus and bacteria.  

Given symptoms of dysuria patient will be treated with antibiotic.  Ultrasound 

shows successful interval progression with a now confirmed single live 

intrauterine gestation course pending to 6 weeks 4 days.  Heart rate 132.  

Patient will follow up with OB/GYN as soon as possible.  She states she will 

call her OB/GYN tomorrow, they are out of [place].  She has any 

worsening symptoms she will return here to the emergency room.





- Lab Data


Result diagrams: 


                                 20 15:46





                                 20 15:46


                                   Lab Results











  20 Range/Units





  14:30 15:46 15:46 


 


WBC   9.7   (3.8-10.6)  k/uL


 


RBC   4.12   (3.80-5.40)  m/uL


 


Hgb   13.4   (11.4-16.0)  gm/dL


 


Hct   38.7   (34.0-46.0)  %


 


MCV   94.0   (80.0-100.0)  fL


 


MCH   32.4   (25.0-35.0)  pg


 


MCHC   34.5   (31.0-37.0)  g/dL


 


RDW   12.7   (11.5-15.5)  %


 


Plt Count   252   (150-450)  k/uL


 


Neutrophils %   76   %


 


Lymphocytes %   19   %


 


Monocytes %   3   %


 


Eosinophils %   1   %


 


Basophils %   0   %


 


Neutrophils #   7.3   (1.3-7.7)  k/uL


 


Lymphocytes #   1.9   (1.0-4.8)  k/uL


 


Monocytes #   0.3   (0-1.0)  k/uL


 


Eosinophils #   0.1   (0-0.7)  k/uL


 


Basophils #   0.0   (0-0.2)  k/uL


 


Sodium    134 L  (137-145)  mmol/L


 


Potassium    4.1  (3.5-5.1)  mmol/L


 


Chloride    104  ()  mmol/L


 


Carbon Dioxide    22  (22-30)  mmol/L


 


Anion Gap    8  mmol/L


 


BUN    7  (7-17)  mg/dL


 


Creatinine    0.48 L  (0.52-1.04)  mg/dL


 


Est GFR (CKD-EPI)AfAm    >90  (>60 ml/min/1.73 sqM)  


 


Est GFR (CKD-EPI)NonAf    >90  (>60 ml/min/1.73 sqM)  


 


Glucose    87  (74-99)  mg/dL


 


Calcium    9.0  (8.4-10.2)  mg/dL


 


Total Bilirubin    0.5  (0.2-1.3)  mg/dL


 


AST    30  (14-36)  U/L


 


ALT    47 H  (4-34)  U/L


 


Alkaline Phosphatase    75  ()  U/L


 


Total Protein    6.4  (6.3-8.2)  g/dL


 


Albumin    3.8  (3.5-5.0)  g/dL


 


Urine Color  Yellow    


 


Urine Appearance  Cloudy H    (Clear)  


 


Urine pH  6.5    (5.0-8.0)  


 


Ur Specific Gravity  1.017    (1.001-1.035)  


 


Urine Protein  Negative    (Negative)  


 


Urine Glucose (UA)  Negative    (Negative)  


 


Urine Ketones  Negative    (Negative)  


 


Urine Blood  Negative    (Negative)  


 


Urine Nitrite  Negative    (Negative)  


 


Urine Bilirubin  Negative    (Negative)  


 


Urine Urobilinogen  <2.0    (<2.0)  mg/dL


 


Ur Leukocyte Esterase  Moderate H    (Negative)  


 


Urine RBC  2    (0-5)  /hpf


 


Urine WBC  5    (0-5)  /hpf


 


Ur Squamous Epith Cells  20 H    (0-4)  /hpf


 


Urine Bacteria  Rare H    (None)  /hpf


 


Urine Mucus  Occasional H    (None)  /hpf


 


Trichomonas Ag (Rapid)     (Negative)  














  20 Range/Units





  15:46 


 


WBC   (3.8-10.6)  k/uL


 


RBC   (3.80-5.40)  m/uL


 


Hgb   (11.4-16.0)  gm/dL


 


Hct   (34.0-46.0)  %


 


MCV   (80.0-100.0)  fL


 


MCH   (25.0-35.0)  pg


 


MCHC   (31.0-37.0)  g/dL


 


RDW   (11.5-15.5)  %


 


Plt Count   (150-450)  k/uL


 


Neutrophils %   %


 


Lymphocytes %   %


 


Monocytes %   %


 


Eosinophils %   %


 


Basophils %   %


 


Neutrophils #   (1.3-7.7)  k/uL


 


Lymphocytes #   (1.0-4.8)  k/uL


 


Monocytes #   (0-1.0)  k/uL


 


Eosinophils #   (0-0.7)  k/uL


 


Basophils #   (0-0.2)  k/uL


 


Sodium   (137-145)  mmol/L


 


Potassium   (3.5-5.1)  mmol/L


 


Chloride   ()  mmol/L


 


Carbon Dioxide   (22-30)  mmol/L


 


Anion Gap   mmol/L


 


BUN   (7-17)  mg/dL


 


Creatinine   (0.52-1.04)  mg/dL


 


Est GFR (CKD-EPI)AfAm   (>60 ml/min/1.73 sqM)  


 


Est GFR (CKD-EPI)NonAf   (>60 ml/min/1.73 sqM)  


 


Glucose   (74-99)  mg/dL


 


Calcium   (8.4-10.2)  mg/dL


 


Total Bilirubin   (0.2-1.3)  mg/dL


 


AST   (14-36)  U/L


 


ALT   (4-34)  U/L


 


Alkaline Phosphatase   ()  U/L


 


Total Protein   (6.3-8.2)  g/dL


 


Albumin   (3.5-5.0)  g/dL


 


Urine Color   


 


Urine Appearance   (Clear)  


 


Urine pH   (5.0-8.0)  


 


Ur Specific Gravity   (1.001-1.035)  


 


Urine Protein   (Negative)  


 


Urine Glucose (UA)   (Negative)  


 


Urine Ketones   (Negative)  


 


Urine Blood   (Negative)  


 


Urine Nitrite   (Negative)  


 


Urine Bilirubin   (Negative)  


 


Urine Urobilinogen   (<2.0)  mg/dL


 


Ur Leukocyte Esterase   (Negative)  


 


Urine RBC   (0-5)  /hpf


 


Urine WBC   (0-5)  /hpf


 


Ur Squamous Epith Cells   (0-4)  /hpf


 


Urine Bacteria   (None)  /hpf


 


Urine Mucus   (None)  /hpf


 


Trichomonas Ag (Rapid)  Negative  (Negative)  














Disposition


Clinical Impression: 


 Intrauterine pregnancy, Dysuria





Disposition: HOME SELF-CARE


Condition: Good


Instructions (If sedation given, give patient instructions):  Urinary Tract 

Infection in Women (ED)


Additional Instructions: 


Please take antibiotic as directed.  Please follow up as soon as possible with 

her OB/GYN.  Call tomorrow.  Continue to take prenatal vitamins.  Return to the 

emergency room for any worsening symptoms or vaginal bleeding.


Prescriptions: 


Cephalexin [Keflex] 500 mg PO Q6HR 7 Days #28 cap


Is patient prescribed a controlled substance at d/c from ED?: No


Referrals: 


Meaghan Roach MD [REFERRING] - 1-2 days


Time of Disposition: 17:01

## 2020-07-01 NOTE — ED
ENT HPI





- General


Chief complaint: ENT


Stated complaint: Chills, sore throat, 11 wks pregnant


Time Seen by Provider: 07/01/20 21:38


Source: patient


Mode of arrival: ambulatory


Limitations: no limitations





- History of Present Illness


Initial comments: 





Patient is a 30-year-old female presenting to the emergency Department with 

complaints of a sore throat and a cough that started yesterday.  Patient states 

she has been feeling hot and having chills but no recorded fevers.  She states 

she also has some mild nasal congestion and is also admitting to mild dysuria.  

She is currently 11 weeks pregnant, no complications thus far.  She denies any 

abdominal pain, vaginal bleeding.  She states she is nauseous but she has been 

throughout her entire pregnancy.  She denies any chest pain, shortness of 

breath.  She does admit to history of mild asthma.  She states she is very 

anxious about the possibility of having Covid.  She denies any recent sick 

contacts.  She has no further complaints at this time.  Upon arrival to the ER, 

her vitals are stable.





- Related Data


                                Home Medications











 Medication  Instructions  Recorded  Confirmed


 


Pnv No.95/Ferrous Fum/Folic AC 1 tab PO DAILY 07/01/20 07/01/20





[Prenatal Multivitamin Tablet]   











                                    Allergies











Allergy/AdvReac Type Severity Reaction Status Date / Time


 


Penicillins Allergy  Swelling Verified 07/01/20 22:15














Review of Systems


ROS Statement: 


Those systems with pertinent positive or pertinent negative responses have been 

documented in the HPI.





ROS Other: All systems not noted in ROS Statement are negative.





Past Medical History


Past Medical History: Asthma


History of Any Multi-Drug Resistant Organisms: None Reported


Past Surgical History: Tonsillectomy


Past Psychological History: Anxiety


Smoking Status: Current every day smoker


Past Alcohol Use History: None Reported


Past Drug Use History: None Reported, Marijuana





General Exam





- General Exam Comments


Initial Comments: 





GENERAL: 


Well-appearing, well-nourished and in no acute distress.





HEAD: 


Atraumatic, normocephalic.





EYES:


Pupils equal round and reactive to light, extraocular movements intact, sclera 

anicteric, conjunctiva are normal.





ENT: 


TMs normal, nares patent, oropharynx clear without exudates.  Moist mucous 

membranes.





NECK: 


Normal range of motion, supple without lymphadenopathy or JVD.





LUNGS:


 Breath sounds clear to auscultation bilaterally and equal.  No wheezes rales or

rhonchi.





HEART:


Regular rate and rhythm without murmurs, rubs or gallops.





ABDOMEN: 


Soft, nontender, normoactive bowel sounds.  No guarding, no rebound.  No masses 

appreciated.





: Deferred 





EXTREMITIES: 


Normal range of motion, no pitting or edema.  No clubbing or cyanosis.





NEUROLOGICAL: 


Normal speech, normal gait.





PSYCH:


Normal mood, normal affect.





SKIN:


 Warm, Dry, normal turgor, no rashes or lesions noted.


Limitations: no limitations





Course


                                   Vital Signs











  07/01/20 07/01/20 07/01/20





  21:34 21:52 22:46


 


Temperature 98.9 F  99.5 F


 


Pulse Rate 122 H  96


 


Respiratory 18 16 16





Rate   


 


Blood Pressure 139/69  115/71


 


O2 Sat by Pulse 97  98





Oximetry   














Medical Decision Making





- Medical Decision Making





Patient is a 22-year-old female here for a sore throat, cough for one day.  She 

is very anxious about possibly having Covid.  No recorded fevers.  She is 

afebrile here.  Her exam is unremarkable.  She is currently 11 weeks pregnant, 

no complications.  No abdominal pain or vaginal bleeding.  Was negative, chest 

x-ray showed no acute findings.  Her urine showed no signs of infection.  We did

do a Covid swab and that is pending at this time.  I discussed with patient 

these findings.  She most likely has a mild viral illness.  I did recommend salt

water gargles for her sore throat as well as Tylenol.  She is stable for 

discharge and is in agreement with this plan of care.  She'll follow-up with her

OB/GYN.  Return parameters were discussed with the patient she verbalized 

understanding.





- Lab Data


                                   Lab Results











  07/01/20 07/01/20 Range/Units





  21:57 21:58 


 


Urine Color  Yellow   


 


Urine Appearance  Cloudy H   (Clear)  


 


Urine pH  8.5 H   (5.0-8.0)  


 


Ur Specific Gravity  1.021   (1.001-1.035)  


 


Urine Protein  1+ H   (Negative)  


 


Urine Glucose (UA)  Negative   (Negative)  


 


Urine Ketones  Negative   (Negative)  


 


Urine Blood  Negative   (Negative)  


 


Urine Nitrite  Negative   (Negative)  


 


Urine Bilirubin  Negative   (Negative)  


 


Urine Urobilinogen  4.0   (<2.0)  mg/dL


 


Ur Leukocyte Esterase  Small H   (Negative)  


 


Urine RBC  1   (0-5)  /hpf


 


Urine WBC  5   (0-5)  /hpf


 


Ur Squamous Epith Cells  62 H   (0-4)  /hpf


 


Urine Mucus  Rare H   (None)  /hpf


 


Group A Strep Rapid   Negative  (Negative)  














Disposition


Clinical Impression: 


 Sore throat, Cough, Viral illness





Disposition: HOME SELF-CARE


Condition: Stable


Instructions (If sedation given, give patient instructions):  Cold Symptoms (ED)


Additional Instructions: 


Please return to the Emergency Department if symptoms worsen or any other 

concerns.


Recommend cough drops, Tylenol for throat discomfort.  


Your COVID test is pending at this time. 


Follow up with OB/GYN.


Is patient prescribed a controlled substance at d/c from ED?: No


Referrals: 


None,Stated [Primary Care Provider] - 1-2 days

## 2020-07-01 NOTE — XR
EXAMINATION TYPE: XR chest 2V

 

DATE OF EXAM: 7/1/2020

 

COMPARISON: 2/18/2020

 

HISTORY: Cough and fever

 

TECHNIQUE:

 

FINDINGS: Heart and mediastinum are normal. Lungs are clear. Diaphragm is normal. Bony thorax appears
 normal. Pulmonary vascularity is normal.

 

IMPRESSION: Normal chest. No change.

## 2020-10-15 NOTE — P.MSEPDOC
Presenting Problems





- Arrival Data


Date of Arrival on Unit: 20


Time of Arrival on Unit: 13:42


Mode of Transport: Ambulatory





- Complaint


OB-Reason for Admission/Chief Complaint: Pain


Comment: right lower back flank pain rating at 8/10





Prenatal Medical History





- Pregnancy Information


: 3


Para: 2


Term: 0


: 0


Abortions: Spontaneous or Elective: 0


Number of Living Children: 2





- Gestational Age


Gestational Age by JOANN (wks/days): 24 Weeks and 0 Days





- Prenatal History


Pregnancy Complications: Smoker





Review of Systems





- Review of Systems


Constitutional: No problems


Breast: No problems


ENT: No problems


Cardiovascular: No problems


Respiratory: No problems


Gastrointestinal: No problems


Genitourinary: No problems


Musculoskeletal: No problems


Neurological: No problems


Skin: No problems





Vital Signs





- Temperature


Temperature: 97.3 F


Temperature Source: Temporal Artery Scan





- Pulse


  ** Right Brachial


Pulse Rate: 90


Pulse Assessment Method: Automatic Cuff





- Respirations


Respiratory Rate: 18


Oxygen Delivery Method: Room Air


O2 Sat by Pulse Oximetry: 98





- Blood Pressure


  ** Right Arm


Blood Pressure: 107/62


Blood Pressure Mean: 77


Blood Pressure Source: Automatic Cuff





Medical Screen Scoring (Pre)





- Cervical Exam


Dilation: Exam Deferred


Effacement: Exam Deferred


Membranes: Intact





- Uterine Contractions


Frequency: N/A


Duration: N/A


Intensity: N/A





- Maternal Vital Signs


Maternal Temperature: N/A


Maternal Blood Pressure: N/A


Signs of Preeclampsia: N/A


Maternal Respirations: N/A





- Maternal Trauma


Maternal Trauma: N/A





- Fetal Assessment - Baby A


Baseline FHR: 150


Fetal Position: N/A


Fetal Station: N/A





- Total Score - Baby A


Total Score - Baby A: 0





- Total Score - Baby B


Total Score - Baby B: 0





- Total Score - Baby C


Total Score - Baby C: 0





- Level of Risk - Baby A


Level of Risk - Baby A: Low (0-5)





- Level of Risk - Baby B


Level of Risk - Baby B: Low (0-5)





- Level of Risk - Baby C


Level of Risk - Baby C: Low (0-5)





Physician Notification (Pre)





- Physician Notified


Physician Notified Date: 20


Physician Notified Time: 14:05


New Order Received: Yes





- Notification Comment


Comment: lab work, ua, ivf,zofran and offirmeve ordered, pt left AMA at 1505 due

to her son being dropped off at home alone and he is only 7 years old, she had 

no one else to watch him





Disposition





- Disposition


OB Disposition: Triage


I agree with the RN Medical Screening Exam: Yes


Risk & Benefit of care provided described in d/c instruction: Yes


Diagnosis: LOW BACK PAIN

## 2021-01-04 ENCOUNTER — HOSPITAL ENCOUNTER (INPATIENT)
Dept: HOSPITAL 47 - FBPOP | Age: 31
Discharge: LEFT BEFORE DISCHARGE | End: 2021-01-04
Attending: OBSTETRICS & GYNECOLOGY | Admitting: OBSTETRICS & GYNECOLOGY
Payer: COMMERCIAL

## 2021-01-04 VITALS — RESPIRATION RATE: 20 BRPM | TEMPERATURE: 95.9 F

## 2021-01-04 VITALS — SYSTOLIC BLOOD PRESSURE: 130 MMHG | HEART RATE: 76 BPM | DIASTOLIC BLOOD PRESSURE: 65 MMHG

## 2021-01-04 DIAGNOSIS — Z88.0: ICD-10-CM

## 2021-01-04 DIAGNOSIS — Z3A.39: ICD-10-CM

## 2021-01-04 DIAGNOSIS — F11.90: ICD-10-CM

## 2021-01-04 DIAGNOSIS — J45.909: ICD-10-CM

## 2021-01-04 DIAGNOSIS — Z86.59: ICD-10-CM

## 2021-01-04 DIAGNOSIS — F17.210: ICD-10-CM

## 2021-01-04 DIAGNOSIS — Z79.899: ICD-10-CM

## 2021-01-04 DIAGNOSIS — O41.03X0: Primary | ICD-10-CM

## 2021-01-04 DIAGNOSIS — Z23: ICD-10-CM

## 2021-01-04 LAB
BASOPHILS # BLD AUTO: 0 K/UL (ref 0–0.2)
BASOPHILS NFR BLD AUTO: 0 %
EOSINOPHIL # BLD AUTO: 0 K/UL (ref 0–0.7)
EOSINOPHIL NFR BLD AUTO: 1 %
ERYTHROCYTE [DISTWIDTH] IN BLOOD BY AUTOMATED COUNT: 3.56 M/UL (ref 3.8–5.4)
ERYTHROCYTE [DISTWIDTH] IN BLOOD: 13.2 % (ref 11.5–15.5)
HCT VFR BLD AUTO: 31.5 % (ref 34–46)
HGB BLD-MCNC: 11.1 GM/DL (ref 11.4–16)
KETONES UR QL STRIP.AUTO: (no result)
LYMPHOCYTES # SPEC AUTO: 1.2 K/UL (ref 1–4.8)
LYMPHOCYTES NFR SPEC AUTO: 17 %
MCH RBC QN AUTO: 31.2 PG (ref 25–35)
MCHC RBC AUTO-ENTMCNC: 35.3 G/DL (ref 31–37)
MCV RBC AUTO: 88.5 FL (ref 80–100)
MONOCYTES # BLD AUTO: 0.2 K/UL (ref 0–1)
MONOCYTES NFR BLD AUTO: 3 %
NEUTROPHILS # BLD AUTO: 5.7 K/UL (ref 1.3–7.7)
NEUTROPHILS NFR BLD AUTO: 79 %
PH UR: 8 [PH] (ref 5–8)
PLATELET # BLD AUTO: 274 K/UL (ref 150–450)
PROT UR QL: (no result)
RBC UR QL: 10 /HPF (ref 0–5)
SP GR UR: 1.02 (ref 1–1.03)
SQUAMOUS UR QL AUTO: 3 /HPF (ref 0–4)
UROBILINOGEN UR QL STRIP: 4 MG/DL (ref ?–2)
WBC # BLD AUTO: 7.3 K/UL (ref 3.8–10.6)
WBC #/AREA URNS HPF: 3 /HPF (ref 0–5)

## 2021-01-04 PROCEDURE — 00HU33Z INSERTION OF INFUSION DEVICE INTO SPINAL CANAL, PERCUTANEOUS APPROACH: ICD-10-PCS

## 2021-01-04 PROCEDURE — 86901 BLOOD TYPING SEROLOGIC RH(D): CPT

## 2021-01-04 PROCEDURE — 85025 COMPLETE CBC W/AUTO DIFF WBC: CPT

## 2021-01-04 PROCEDURE — 59025 FETAL NON-STRESS TEST: CPT

## 2021-01-04 PROCEDURE — 82947 ASSAY GLUCOSE BLOOD QUANT: CPT

## 2021-01-04 PROCEDURE — 99215 OFFICE O/P EST HI 40 MIN: CPT

## 2021-01-04 PROCEDURE — 88307 TISSUE EXAM BY PATHOLOGIST: CPT

## 2021-01-04 PROCEDURE — 86780 TREPONEMA PALLIDUM: CPT

## 2021-01-04 PROCEDURE — 87491 CHLMYD TRACH DNA AMP PROBE: CPT

## 2021-01-04 PROCEDURE — 87591 N.GONORRHOEAE DNA AMP PROB: CPT

## 2021-01-04 PROCEDURE — 3E0134Z INTRODUCTION OF SERUM, TOXOID AND VACCINE INTO SUBCUTANEOUS TISSUE, PERCUTANEOUS APPROACH: ICD-10-PCS

## 2021-01-04 PROCEDURE — 3E0R3BZ INTRODUCTION OF ANESTHETIC AGENT INTO SPINAL CANAL, PERCUTANEOUS APPROACH: ICD-10-PCS

## 2021-01-04 PROCEDURE — 76815 OB US LIMITED FETUS(S): CPT

## 2021-01-04 PROCEDURE — 81001 URINALYSIS AUTO W/SCOPE: CPT

## 2021-01-04 PROCEDURE — 86900 BLOOD TYPING SEROLOGIC ABO: CPT

## 2021-01-04 PROCEDURE — 86762 RUBELLA ANTIBODY: CPT

## 2021-01-04 PROCEDURE — 87390 HIV-1 AG IA: CPT

## 2021-01-04 PROCEDURE — 86850 RBC ANTIBODY SCREEN: CPT

## 2021-01-04 PROCEDURE — 80306 DRUG TEST PRSMV INSTRMNT: CPT

## 2021-01-04 PROCEDURE — 87340 HEPATITIS B SURFACE AG IA: CPT

## 2021-01-04 RX ADMIN — POTASSIUM CHLORIDE SCH MLS/HR: 14.9 INJECTION, SOLUTION INTRAVENOUS at 12:09

## 2021-01-04 RX ADMIN — POTASSIUM CHLORIDE SCH MLS/HR: 14.9 INJECTION, SOLUTION INTRAVENOUS at 09:00

## 2021-01-04 RX ADMIN — POTASSIUM CHLORIDE SCH MLS/HR: 14.9 INJECTION, SOLUTION INTRAVENOUS at 09:48

## 2021-01-04 NOTE — P.PROBDLV
Vaginal Delivery Note





- .


Vaginal Delivery Note: 





Patient progressed to complete and pushed with spontaneous vaginal delivery of a

viable female  over an intact perineum.  Shortly before delivery 

artificial rupture membranes was performed and thick meconium is noted.  We'll 

try to have anesthesia present for delivery as well as special care nursery.  

Once she began pushing and approximately 4 pushes she dilated completely and was

on the perineum.  Once baby's head was delivered anterior posterior shoulders w

ere easily delivered with gentle downward upper traction followed by the 

remainder the baby from left occiput anterior position.  Attempts to keep baby 

from crying initially were made with bulb suction mouth and nares.  Spontaneous 

cry is noted shortly thereafter.  Baby was placed on mother's abdomen where the 

umbilical cord was allowed to pulsate for 30 seconds prior to clamping and 

cutting.  Again nursery personnel was present and assumed care.  Placenta was 

then delivered intact following obtaining blood for possible unknown Rh antibody

status.  Pitocin is added to the IV following delivery of placenta.  Apgars were

9 and 9 at one and 5 minutes respectively and weight is pending.  Both mother 

and baby however is stable at this time

## 2021-01-04 NOTE — US
EXAMINATION TYPE: US OB limited

 

DATE OF EXAM: 1/4/2021

 

COMPARISON: None

 

CLINICAL HISTORY: 30-year-old female vaginal bleeding and contractions

 

EXAM PERFORMED:  Transabdominal (TA)

 

GESTATIONAL AGE / DATING

Physician Established: (39 weeks/5 days)

** EDC: 1/6/2021

** No growth performed on today?s study per ordering physician 

 

FETAL SURVEY

 

PLACENTA:  Anterior

PREVIA:  No Previa

** Ultrasound evidence of abruption? No

 

** ALBA: 5.8 cm Borderline oligohydramnios

Sonography notes:** Ultrasound evidence of premature rupture of membranes?  Unable to visualized

 

PRESENTATION: Vertex

 

HEART RATE:  143 bpm

RHYTHM:  Normal

 

Sonographer notes: Possible enlarged stomach with internal echoes. 

 

 

 

IMPRESSION:  

1. Established gestational age of 39 weeks 5 days. No growth assessed on today's exam.

2. Borderline oligohydramnios (ALBA 5.8 cm).

3. Possible distended stomach with internal echoes/debris.

## 2024-01-19 NOTE — P.HPOB
January 19, 2024       Holly Pickens MD  [address]  [address]  [facility] [address]  Via Fax: [phone]      Patient: Fabiana Owusu   YOB: 2012   Date of Visit: 1/19/2024       Dear Dr. Pickens:    Thank you for referring Fabiana Owusu to me for evaluation. Below are my notes for this visit with her.    If you have questions, please do not hesitate to call me. I look forward to following your patient along with you.      Sincerely,        Flavia Rizo MD        CC: No Recipients    Flavia Rizo MD  1/19/2024  4:02 PM  Signed     PEDIATRIC NEUROLOGY AMBULATORY CONSULTATION NOTE        Patient Name: Fabiana Owusu   MRN / CSN: [id number]   Date of Birth / Age / Sex: 2012 ,11 year old,female     Encounter Date: 1/19/2024     CARE TEAM:     Encounter Provider: Flavia Rizo MD   Referring Physician: Holly Pickens MD  [id number]  [facility] [address]  [place] [address]   PCP: Holly Pickens MD   HISTORY     No chief complaint on file.         Age: 11 year old      Fabiana Owusu  is accompanied by mom.     Here for evaluation of abnormal MRI brain   She has been evaluated by ophthalmology  And will be following up in august   Has been evaluated by Neurosurgery   Cyclic vomiting syndrome diagnosed in 2016 this is what promted obtaining MRI brain by GI   She had 4 episodes last month   stomach pain sometimes headache and nausea   Episodes last ~ 2 hrs   zofran helps when given early   Prior medication - cyproheptadine  - weight gain    Family history of migraines in mom   Never tried vitamins     Headaches started showing up in January   Has previously woken up with headaches on occasion   No abnormal eye movements   Sleep: trouble falling asleep 1hr    Interval history:  1/19/2024  New diagnosis of ADHD   She is getting more frequent headaches   Rizatriptan is helping she needs an  updated letter for school   Mom reached out 12/8 about increased headaches   We increased topiramate to 50mg  History of Present Illness


H&P Date: 21


Chief Complaint: Intrauterine pregnancy at unknown dates but suspected term: 

Active labor: V





Patient is a 30-year-old  at unknown dates although she relates that she is

due in 2 days.  She states that she had her first ultrasound about 3 weeks ago 

and that she was supposed to have a plan with her obstetrician later today.  She

ryes complaining of vaginal bleeding and severe pain in her abdomen.  Fetal 

heart tones are reassuring and an different points are reactive.  It is a 

category 1 tracing.  In discussing her pregnancy with her, she relates her no 

issues with the pregnancy other than her drug use.  She relates that through 

most of pregnancy she did not use medications or drugs but recently over the 

last 3 weeks she began using heroin and then later she related that she was 

using Suboxone from a friend of hers as well.  Likely she will go into 

withdrawal symptoms while she is in the hospital and we may need to consult 

medicine for assistance in managing.  She has essentially no prenatal care.  

Furthermore the physician and the hospital that she stated that she was going to

had not heard of her and relate that she had no appointments with the doctor in 

the hospital had no prenatal records either.  We'll therefore assume she had no 

prenatal care.  Labs are per Jeny still pending but all prenatal labs were 

ordered.





On physical exam vital signs are stable and afebrile.  Heart regular, lungs 

clear, extremities without pain.  Abdomen is soft between contractions but she 

is still complaining of pain and there is still some bright red bleeding coming 

from the vagina.  There is no evidence for acute abruption on ultrasound but we 

are treating this as a potential for an abruption situation and her monitoring 

her very closely.  We did discuss possibility of an emergent  should it

be warranted but this time with reassuring fetal heart tones below her to 

progress in labor as she is making cervical change.





Assessment intrauterine pregnancy suspect term based on her description


Plan expect spontaneous vaginal delivery and she plans to do an epidural for 

analgesia.  Since the ultrasound showed possible oligo hydramnios we'll delay ar

tificial rupture membranes until just before delivery.  Continuous fetal 

monitoring.  GBS prophylaxis with unknown GBS status.





Past Medical History


Past Medical History: Asthma


History of Any Multi-Drug Resistant Organisms: None Reported


Past Surgical History: Tonsillectomy


Past Anesthesia/Blood Transfusion Reactions: No Reported Reaction


Past Psychological History: Anxiety


Smoking Status: Current every day smoker


Past Alcohol Use History: None Reported


Past Drug Use History: Cocaine, Heroin, Marijuana





- Past Family History


  ** Mother


History Unknown: Yes


Family Medical History: No Reported History





Medications and Allergies


                                Home Medications











 Medication  Instructions  Recorded  Confirmed  Type


 


Pnv No.95/Ferrous Fum/Folic AC 1 tab PO DAILY 20 History





[Prenatal Multivitamin Tablet]    








                                    Allergies











Allergy/AdvReac Type Severity Reaction Status Date / Time


 


Penicillins Allergy  Swelling Verified 21 08:33














Exam


Osteopathic Statement: *.  No significant issues noted on an osteopathic 

structural exam other than those noted in the History and Physical/Consult.


                                   Vital Signs











  Temp Pulse Resp BP Pulse Ox


 


 21 11:12  96.8 F L  105 H  20  134/70  97








                                Intake and Output











 21





 22:59 06:59 14:59


 


Other:   


 


  Weight   78.471 kg














Results


Result Diagrams: 


                                 21 08:53





                                 21 08:53


                  Abnormal Lab Results - Last 24 Hours (Table)











  21 Range/Units





  08:53 08:53 09:10 


 


RBC  3.56 L    (3.80-5.40)  m/uL


 


Hgb  11.1 L    (11.4-16.0)  gm/dL


 


Hct  31.5 L    (34.0-46.0)  %


 


Glucose   102 H   (74-99)  mg/dL


 


Urine Appearance    Cloudy H  (Clear)  


 


Urine Protein    1+ H  (Negative)  


 


Urine Ketones    3+ H  (Negative)  


 


Urine Blood    Moderate H  (Negative)  


 


Ur Leukocyte Esterase    Trace H  (Negative)  


 


Urine RBC    10 H  (0-5)  /hpf


 


Urine Mucus    Rare H  (None)  /hpf


 


Urine Opiates Screen    Detected H  (NotDetected)  


 


U Benzodiazepines Scrn    Detected H  (NotDetected)  


 


U Marijuana (THC) Screen    Detected H  (NotDetected) /100 and this is not helping   She is now complaining of   Headaches if hair is too tight   Movement also triggers headaches   She is not waking up with headache only rarely   Emesis with 50% of headaches   She is responding to rizatriptan has taken second dose   Laying flat   2-3 days a weel   Eats breakfast most days  Half a watter bottle   Teacher stated she thought she had dyslexia   4th grade and doing 5th grade work   C in reading.          10/17/2023  Doing better overall per mom and Laylah   Evaluated by NSGY 4/2023 repeat mri stable   1-2 times a month abdominal pain with vomiting 1-2 times   zofran helps if started fast enough   Headaches 1-2 times a month not associated with vomiting   Followed up with ophthalmologist - had small vision change     3/10/2023  No change in headaches   Episodes  Of emesis every 2-3 weeks all day   Keep home   Not taking any medication   Seeing ophthalmology today     Headache history:  Age of Onset:  Aura:  Location:side and and all around   Character: throbbing   Duration:  Timing: sometimes in the morning   Frequency:daily every other day   Severity:  Autonomic Phenomena:   Triggers/Exacerbating Factors:sensitive to light and sound  Associated symptoms: nausea and vomiting 50%  of the time with headache, she is complaining of blurry vision with headaches   Alleviating Factors: tylenol 1-2   Current Treatments: Tylenol   Past Treatments:  Red Flags:  Positional component No    Wakes patient up from sleep No  Missed School or Activities: yes early dismissal   Sleep Behavior: 8:30 -9pm - 6:45   Dietary Factors: no skipping meals   Caffeine:no   Stressors: not a worrier   History of head trauma:  No   History of Motion Sickness or Colic: no         REVIEW of RECORDS:   I have reviewed the available records prior to today's visit.   Past Medical History:   Diagnosis Date   • Cyclical vomiting      Past Surgical History:   Procedure Laterality Date   • Cleft ear repair     •  External ear surgery Right     6 years old           Birth history:  FT no complications     Developmental history:   Age appropriate     Family History:   History reviewed. No pertinent family history.  Siblings and ages:     Brother and sister   Consanguinity:           None     Social History:   Social History     Social History Narrative   • Not on file   mom and siblings   Starting 3rd grade      Review of Systems:    Motor vehicle accidents   None       Unexplained loss of consciousness None       Sleep disturbances    None       Motion sickness    None       Ophthalmologic     Negative       Otolaryngologic    Negative       Cardiovascular    Negative       Respiratory     Negative       Gastrointestinal    Negative       Hematologic     Negative       Dermatologic     Negative       Musculoskeletal    Negative       Genito-urinary    Negative       Endocrine     Negative       Psychiatric     Negative      Medications:    Current Outpatient Medications   Medication Sig Dispense Refill   • amitriptyline (ELAVIL) 10 MG tablet Take 2 tablets by mouth nightly. 30 tablet 1   • topiramate (TOPAMAX) 50 MG tablet Take 1 tablet by mouth every morning AND 2 tablets every evening. 90 tablet 5   • rizatriptan (MAXALT-MLT) 5 MG disintegrating tablet Dissolve 1 tablet on the tongue at onset of migraine. May repeat after 2 hours if needed. 14 tablet 1   • ondansetron (ZOFRAN ODT) 4 MG disintegrating tablet Place 1 tablet onto the tongue every 8 hours as needed for Nausea. 14 tablet 3     No current facility-administered medications for this visit.         Allergies:   ALLERGIES:  No Known Allergies     Immunizations:    Immunizations Reviewed: up to date per record and up to date per parent.     PHYSICAL EXAMINATION   Visit Vitals  BP (!) 120/68 (BP Location: LUE - Left upper extremity, Patient Position: Sitting, Cuff Size: Pediatric)   Pulse 91   Temp 97.4 °F (36.3 °C) (Temporal)   Resp (!) 18   Ht 4' 7.91\" (1.42 m)   Wt  54.6 kg (120 lb 5.9 oz)   BMI 27.08 kg/m²         GENERAL  Appearance - Well developed, well nourished  Head - Normocephalic,   Eyes - Conjunctivae clear,   Ears/Nose/Mouth - Pinna normally formed. No nasal discharge. OP clear, no clefts.    Neck - No masses or lesions, normal ROM  Respiratory - Easy WOB,  CV - Regular rate and rhythm,    - Deferred  Spine/Extremities - Atraumatic,     NEUROLOGICAL  Mental Status - Awake and alert. Language and social interactions are developmentally appropriate  Cranial Nerves    II - Visuals fields appear full. PERRL   III, IV, VI - extraocular movements intact   V - blink reflex intact,    VII - no facial weakness, symmetrical eye closure and smile   VIII - hears    IX, X, XII - palate elevates symmetrically, tongue protrudes midline, no drooling  Motor - appropriate tone when at rest and active, normal bulk and strength. No abnormal movements.  Reflexes - 2+ in BL biceps,  patellae and ankles. Downgoing toes BL.   Sensory - Withdraws to noxious stimuli in all extremities  Coordination - Fine motor movements are appropriate for age. Smooth reaching for objects.  Gait - Age appropriate with normal balance, able to run, able to step        DIAGNOSTIC TESTING   LABS:   Neurophysiology :   IMAGIN/2023 MRI brain   IMPRESSION:  No significant change in the pineal cyst.    2022  There is a cyst in the pineal gland measuring 1.4 x 1.2 x 0.9 cm which  appears unchanged in size since the prior exam. The pineal cyst abuts the  tectal plate of the midbrain without placing significant mass effect. The  cerebral aqueduct appears patent. There is no hydrocephalus. The remainder  of the brain appears normal.    Unchanged pineal cyst.    2022 MRI brain w/wo     1.   Motion degraded examination. No acute brain abnormality.  2.   A 1.6 cm pineal cyst abuts the tectal plate without significant mass effect or upstream ventricular dilatation.    MEDICAL DECISION MAKING   Impression:         Fabiana wOusu  is a 11 year old female  with history of cyclic vomiting  presenting for evaluation of Pineal gland cyst. Her neurological exam is normal. MRI brain 5/2023 no changes,.      We have previously reviewed diagnosis of Pineal cyst which is mostly a radiological diagnosis and is important to differentiate from cystic tumors. We reviewed that these are typically incidental findings and >75% stay stable over time . 15% may shrink and ~ 8% may increase in size. We reviewed symptoms which would be of concern including abnormal eye movements , morning headaches associated with vomiting.    She has hadincrease in  both headache since 11/2023      1. Migraine variant    2. Attention deficit hyperactivity disorder (ADHD), combined type    3. Pineal gland cyst          Plan:       Wean topiramate   Week 1: 50mg Am and 50mg pm   B. Week 2: 50mg pm   C. Week 3: stop   2. Start Amitriptyline 10mg at bedtime X 1 week then increase to 20mg at bedtime there after   3. Abortive treatment no more than three times a week:    - Rizatriptan 5mg may repeat 2nd dose 2 hrs later if needed    - Zofran 4mg every 8hrs for nausea    - Ibuprofen 400mg   4. Letter for school - medication administration   5. Letter with ADHD accommodations and request for social work   6. Headache and sleep hygiene   7. Follow up with ophthalmology   9. Call our office with worsening headaches or medication side effects   10. Call our office with concerns for worsening consistent morning headaches , vision changes, abnormal eye movements.       A copy of my note has been faxed/sent to Holly Pickens MD  [address] [address]    I discussed my impression and recommendations above with the family, who verbalized understanding. No barrier to learning was identified. I provided the family with my contact information should they have any questions or concerns after the visit.     Thank you for involving me in the care of your  patient.      Flavia Rizo MD